# Patient Record
Sex: FEMALE | Race: WHITE | NOT HISPANIC OR LATINO | URBAN - METROPOLITAN AREA
[De-identification: names, ages, dates, MRNs, and addresses within clinical notes are randomized per-mention and may not be internally consistent; named-entity substitution may affect disease eponyms.]

---

## 2025-03-17 ENCOUNTER — HOSPITAL ENCOUNTER (OUTPATIENT)
Dept: RADIOLOGY | Facility: EXTERNAL LOCATION | Age: 70
Discharge: HOME | End: 2025-03-17
Payer: MEDICARE

## 2025-03-21 ENCOUNTER — OFFICE VISIT (OUTPATIENT)
Dept: SURGICAL ONCOLOGY | Facility: CLINIC | Age: 70
End: 2025-03-21
Payer: MEDICARE

## 2025-03-21 VITALS
HEIGHT: 61 IN | SYSTOLIC BLOOD PRESSURE: 166 MMHG | WEIGHT: 150 LBS | HEART RATE: 72 BPM | DIASTOLIC BLOOD PRESSURE: 85 MMHG | OXYGEN SATURATION: 98 % | BODY MASS INDEX: 28.32 KG/M2

## 2025-03-21 DIAGNOSIS — D49.0 IPMN (INTRADUCTAL PAPILLARY MUCINOUS NEOPLASM): Primary | ICD-10-CM

## 2025-03-21 PROCEDURE — 1125F AMNT PAIN NOTED PAIN PRSNT: CPT | Performed by: PHYSICIAN ASSISTANT

## 2025-03-21 PROCEDURE — 99204 OFFICE O/P NEW MOD 45 MIN: CPT | Performed by: PHYSICIAN ASSISTANT

## 2025-03-21 PROCEDURE — 99214 OFFICE O/P EST MOD 30 MIN: CPT | Performed by: PHYSICIAN ASSISTANT

## 2025-03-21 PROCEDURE — 1159F MED LIST DOCD IN RCRD: CPT | Performed by: PHYSICIAN ASSISTANT

## 2025-03-21 PROCEDURE — 3008F BODY MASS INDEX DOCD: CPT | Performed by: PHYSICIAN ASSISTANT

## 2025-03-21 RX ORDER — MELOXICAM 15 MG/1
TABLET ORAL
COMMUNITY
Start: 2025-02-22

## 2025-03-21 RX ORDER — OMEPRAZOLE 20 MG/1
CAPSULE, DELAYED RELEASE ORAL
COMMUNITY
Start: 2025-02-20

## 2025-03-21 RX ORDER — IRBESARTAN AND HYDROCHLOROTHIAZIDE 300; 12.5 MG/1; MG/1
TABLET, FILM COATED ORAL
COMMUNITY
Start: 2025-01-19

## 2025-03-21 RX ORDER — AMLODIPINE BESYLATE 5 MG/1
TABLET ORAL
COMMUNITY
Start: 2025-02-20

## 2025-03-21 RX ORDER — GABAPENTIN 100 MG/1
CAPSULE ORAL
COMMUNITY
Start: 2025-02-20

## 2025-03-21 ASSESSMENT — PAIN SCALES - GENERAL: PAINLEVEL_OUTOF10: 4

## 2025-03-21 NOTE — PROGRESS NOTES
"Subjective   Ms. Joy is a 69-year-old female who is referred by Dr. Дмитрий Logan for multifocal pancreatic cysts.     She has been experiencing periumbilical pain since early December. This pain wraps around her abdomen and back, \"all the way around.\" The pain is constant and present daily. She describes it as \"pins a needles\" and \"burning.\" It is \"possibly\" worse after eating though does not prevent her from eating. The pain is not affected by her bowel movements though she does tend towards constipation.     She saw her primary care doctor who ordered a CT A/P on 25 that showed an incidental finding of a 1.5 cm cyst in the body of the pancreas.     She subsequently had a MRI on 25 which demonstrated multifocal pancreatic cysts measuring up to 1.5 cm without MPD dilatation. She was referred here to discuss.     She denies a personal history of acute pancreatitis. She is up to date with colon cancer screening ([employer]). She has previously had an EGD but \"long ago.\"     Medical and surgical history: Sjogren's, psoriasis, hypertension, cervical and lumbar spinal stenosis with prior surgeries, s/p hysterectomy, s/p tonsillectomy, s/p  x 2.   Family history: No pancreatic cancer.   Social history: Former smoker. Rare alcohol use. No illicits. .     Objective     /85   Pulse 72   Ht 1.549 m (5' 1\")   Wt 68 kg (150 lb)   SpO2 98%   BMI 28.34 kg/m²      Physical Exam  General: in no acute distress, comfortable   Eyes: no pallor or scleral icterus  Respiratory: even, unlabored  Gastrointestinal: healed hysterectomy scar, non-distended, abdomen soft, non-tender, no masses   Musculoskeletal: normal gate, no deformities   Integumentary: no concerning lesions, no jaundice  Neurologic: no gross deficits  Psychiatric: cognition intact, mood appropriate    Assessment/Plan   Ms. Joy is a 69-year-old female who is referred by Dr. Дмитрий Logan for multifocal pancreatic cysts. "     PLAN: I personally reviewed her images. She has multifocal pancreatic cysts with the dominant cyst in the body of the pancreas measuring 1.5 cm. These are branch duct IPMNs.     I discussed that branch duct IPMNs represent potentially pre-malignant lesions and are managed based on the presence or absence of high risk stigmata or concerning features including cyst size, cyst growth over time, enhancing mural nodule or main duct dilatation. Management decisions are based on these features, as well as, personal medical history, family history, presence or absence of symptoms and patient preference.     There are no high risk or worrisome features. These are average risk cyst. I recommend annual surveillance with MRI/MRCP. I can try to accomplish this at [facility] (though has proven somewhat difficult in the past).     Regarding her pain, I explained that branch duct IPMNs are incidental findings and are largely asymptomatic. I otherwise see no evidence of acute or chronic pancreatitis on her imaging. I suspect her pain is neuropathic, based on the description (wrapping across her abdomen and back, pins and needles + burning sensation, etc). I encouraged her to see Pain Management or possibly Rheumatology for further advice (she is concerned she has fibromyalgia).       Eileen García PA-C